# Patient Record
Sex: FEMALE | Race: BLACK OR AFRICAN AMERICAN | URBAN - METROPOLITAN AREA
[De-identification: names, ages, dates, MRNs, and addresses within clinical notes are randomized per-mention and may not be internally consistent; named-entity substitution may affect disease eponyms.]

---

## 2021-10-18 LAB — PAP SMEAR: NORMAL

## 2021-11-03 ENCOUNTER — OFFICE VISIT (OUTPATIENT)
Dept: INTERNAL MEDICINE CLINIC | Age: 26
End: 2021-11-03
Payer: COMMERCIAL

## 2021-11-03 VITALS
BODY MASS INDEX: 22.88 KG/M2 | DIASTOLIC BLOOD PRESSURE: 78 MMHG | WEIGHT: 142.4 LBS | HEART RATE: 77 BPM | SYSTOLIC BLOOD PRESSURE: 110 MMHG | TEMPERATURE: 97.2 F | HEIGHT: 66 IN | OXYGEN SATURATION: 100 %

## 2021-11-03 DIAGNOSIS — R53.83 OTHER FATIGUE: ICD-10-CM

## 2021-11-03 DIAGNOSIS — R82.90 ABNORMAL URINALYSIS: ICD-10-CM

## 2021-11-03 DIAGNOSIS — F41.9 ANXIETY: ICD-10-CM

## 2021-11-03 DIAGNOSIS — Z13.9 SCREENING FOR CONDITION: ICD-10-CM

## 2021-11-03 DIAGNOSIS — F43.9 SITUATIONAL STRESS: ICD-10-CM

## 2021-11-03 DIAGNOSIS — R55 SYNCOPE, UNSPECIFIED SYNCOPE TYPE: Primary | ICD-10-CM

## 2021-11-03 DIAGNOSIS — E16.2 HYPOGLYCEMIA: ICD-10-CM

## 2021-11-03 LAB
BILIRUBIN, POC: NORMAL
BLOOD URINE, POC: NORMAL
CHP ED QC CHECK: NORMAL
CLARITY, POC: CLEAR
COLOR, POC: YELLOW
CONTROL: NORMAL
GLUCOSE BLD-MCNC: 87 MG/DL
GLUCOSE URINE, POC: NORMAL
KETONES, POC: NORMAL
LEUKOCYTE EST, POC: NORMAL
NITRITE, POC: NORMAL
PH, POC: 5.5
PREGNANCY TEST URINE, POC: NORMAL
PROTEIN, POC: NORMAL
SPECIFIC GRAVITY, POC: >=1.03
UROBILINOGEN, POC: 0.2

## 2021-11-03 PROCEDURE — 82962 GLUCOSE BLOOD TEST: CPT | Performed by: INTERNAL MEDICINE

## 2021-11-03 PROCEDURE — 93000 ELECTROCARDIOGRAM COMPLETE: CPT | Performed by: INTERNAL MEDICINE

## 2021-11-03 PROCEDURE — 99204 OFFICE O/P NEW MOD 45 MIN: CPT | Performed by: INTERNAL MEDICINE

## 2021-11-03 PROCEDURE — 81025 URINE PREGNANCY TEST: CPT | Performed by: INTERNAL MEDICINE

## 2021-11-03 PROCEDURE — 81002 URINALYSIS NONAUTO W/O SCOPE: CPT | Performed by: INTERNAL MEDICINE

## 2021-11-03 RX ORDER — NORGESTIMATE AND ETHINYL ESTRADIOL 0.25-0.035
1 KIT ORAL DAILY
COMMUNITY

## 2021-11-03 SDOH — ECONOMIC STABILITY: FOOD INSECURITY: WITHIN THE PAST 12 MONTHS, THE FOOD YOU BOUGHT JUST DIDN'T LAST AND YOU DIDN'T HAVE MONEY TO GET MORE.: NEVER TRUE

## 2021-11-03 SDOH — ECONOMIC STABILITY: FOOD INSECURITY: WITHIN THE PAST 12 MONTHS, YOU WORRIED THAT YOUR FOOD WOULD RUN OUT BEFORE YOU GOT MONEY TO BUY MORE.: NEVER TRUE

## 2021-11-03 ASSESSMENT — PATIENT HEALTH QUESTIONNAIRE - PHQ9
SUM OF ALL RESPONSES TO PHQ QUESTIONS 1-9: 0
1. LITTLE INTEREST OR PLEASURE IN DOING THINGS: 0
2. FEELING DOWN, DEPRESSED OR HOPELESS: 0
SUM OF ALL RESPONSES TO PHQ9 QUESTIONS 1 & 2: 0
SUM OF ALL RESPONSES TO PHQ QUESTIONS 1-9: 0
SUM OF ALL RESPONSES TO PHQ QUESTIONS 1-9: 0

## 2021-11-03 ASSESSMENT — SOCIAL DETERMINANTS OF HEALTH (SDOH): HOW HARD IS IT FOR YOU TO PAY FOR THE VERY BASICS LIKE FOOD, HOUSING, MEDICAL CARE, AND HEATING?: NOT HARD AT ALL

## 2021-11-03 NOTE — PROGRESS NOTES
SUBJECTIVE:  Antione Friend is a 26 y.o. female HERE FOR   Chief Complaint   Patient presents with    New Patient        PT HERE TO INITIATE CARE   PREVIOUS PCP: NONE    SYNCOPE - 1 YEAR AGO. STATES PRIOR EPISODE. ANXIETY - DIAGNOSED ? YEARS AGO. NT ON MED. DENIES INSOMNIA. DENIES SUICIDAL / NO HOMICIDAL THOUGHTS / IDEATION  C/O STRESS - JUST FINISHED DUAL DEGREE. HIGH PACED JOB. STATES ENJOYING JOB. C/O FATIGUE - ? DURATION. + COLD INTOLERANCE  H/O HYPOGLYCEMIA  - ? DURATION. SCREENING LABS D/W PT    DENIES CP, No SOB, No PALPITATIONS, No COUGH, NO F/C  No ABD PAIN, No N/V, No DIARRHEA, No CONSTIPATION, No MELENA, No HEMATOCHEZIA. No DYSURIA, No FREQ, No URGENCY, No HEMATURIA    PMH: REVIEWED AND UPDATED TODAY    PSH: REVIEWED AND UPDATED TODAY    SOCIAL HX: REVIEWED AND UPDATED TODAY    FAMILY HX: REVIEWED AND UPDATED TODAY    ALLERGY:  Patient has no known allergies. MEDS: REVIEWED  Prior to Visit Medications    Medication Sig Taking? Authorizing Provider   norgestimate-ethinyl estradiol (ESTARYLLA) 0.25-35 MG-MCG per tablet Take 1 tablet by mouth daily Yes Historical Provider, MD     OB/GYN: LMP 10/21                  LAST PAP SMEAR 10/21                  NO PRIOR MAMMOGRAM     IMMUNIZATION: DOES NOT TAKE INFLUENZA, TETANUS ? DATE, [address]    ROS: COMPREHENSIVE ROS AS IN HX, REST -VE  History obtained from chart review and the patient       OBJECTIVE:   NURSING NOTE AND VITALS REVIEWED  /70 (Site: Left Upper Arm, Position: Sitting, Cuff Size: Medium Adult)   Pulse 77   Temp 97.2 °F (36.2 °C)   Ht 5' 5.5\" (1.664 m)   Wt 142 lb 6.4 oz (64.6 kg)   SpO2 100%   BMI 23.34 kg/m²     NO ACUTE DISTRESS    REPEAT BP: 110/78 (LT), NO ORTHOSTASIS     Body mass index is 23.34 kg/m².      HEENT: MILD PALLOR, ANICTERIC, PERRLA, EOMI, NO CONJUNCTIVAL ERYTHEMA,                 NO SINUS TENDERNESS  NECK:  SUPPLE, TRACHEA MIDLINE, NT, NO JVD, NO CB, NO LA, NO TM, NO STIFFNESS  CHEST: RESPY EFFORT NL, GOOD AE, NO W/R/C  HEART: S1S2+ REG, NO M/G/R  ABD: SOFT, NT, NO HSM, BS+  EXT: NO EDEMA, NT, PULSES +. OTIS'S -VE  NEURO: ALERT AND ORIENTED X 3, NO MENINGEAL SIGNS, NO TREMORS, NL GAIT, NO FOCAL DEFICITS  PSYCH: FAIRLY GOOD AFFECT  BACK: NT, NO ROM, NO CVA TENDERNESS     PREVIOUS LABS / PAP SMEAR REVIEWED AND D/W PT    UA: MOD BLOOD, TRACE LEUKOCYTES    PREG TEST: NEGATIVE    ACCUCHECK: 87    EKG: SINUS RHYTHM HR 69, NO ACUTE FINDINGS    ASSESSMENT / PLAN:     Diagnosis Orders   1. Syncope, unspecified syncope type  COUNSELLED. EKG UNREMARKABLE. F/U LABS TO EVAL  ADVISED ON STRESS MGT  DEFERRED CARDIO EVAL  MAKE CHANGES AS NEEDED. 2. Anxiety  COUNSELLED. DEFERRED MED  PT COUNSELLED  ON RELAXATION TECHNIQUES. ADDRESSED STRESS MGT. MONITOR  PT NOT SUICIDAL/ NOT HOMICIDAL  F/U LABS  MAKE CHANGES AS NEEDED. 3. Situational stress  COUNSELLED. SITUATIONAL. PT COUNSELLED  ON RELAXATION TECHNIQUES. ADDRESSED STRESS MGT. MONITOR  PT NOT SUICIDAL/ NOT HOMICIDAL  DEFERRED PSYCHOLOGY REFERRAL  MAKE CHANGES AS NEEDED. 4. Other fatigue  COUNSELLED. LABS TO EVAL. R/O ANEMIA,  R/O THYROID D/O.  LIFESTYLE MODIFICATION. MAKE CHANGES AS NEEDED. 5. Hypoglycemia  COUNSELLED. GLUCOSE NORMAL TODAY HYPOGLYCEMIA - SYMPTOMS D/W PT.  ADVISED NOT TO SKIP MEALS. F/U LABS  MAKE CHANGES AS NEEDED. 6. Screening for condition  COUNSELLED. LABS TO EVAL - OK WITH PT. MONITOR  MAKE CHANGES AS NEEDED. 7. Abnormal urinalysis  COUNSELLED. LAB TO EVAL. MAKE CHANGES AS NEEDED BASED ON RESULT.                    PT DECLINED INFLUENZA VACCINE     MEDICATION SIDE EFFECTS D/W PATIENT    RETURN TO CLINIC WITHIN 2-3 MONTHS / PRN    FOLLOW UP FOR FASTING LABS

## 2023-02-21 ENCOUNTER — TELEPHONE (OUTPATIENT)
Dept: INTERNAL MEDICINE CLINIC | Age: 28
End: 2023-02-21